# Patient Record
Sex: FEMALE | Race: WHITE
[De-identification: names, ages, dates, MRNs, and addresses within clinical notes are randomized per-mention and may not be internally consistent; named-entity substitution may affect disease eponyms.]

---

## 2020-02-07 ENCOUNTER — HOSPITAL ENCOUNTER (EMERGENCY)
Dept: HOSPITAL 62 - ER | Age: 25
Discharge: HOME | End: 2020-02-07
Payer: SELF-PAY

## 2020-02-07 VITALS — SYSTOLIC BLOOD PRESSURE: 143 MMHG | DIASTOLIC BLOOD PRESSURE: 72 MMHG

## 2020-02-07 DIAGNOSIS — Z91.013: ICD-10-CM

## 2020-02-07 DIAGNOSIS — R06.00: ICD-10-CM

## 2020-02-07 DIAGNOSIS — J20.9: Primary | ICD-10-CM

## 2020-02-07 PROCEDURE — 87070 CULTURE OTHR SPECIMN AEROBIC: CPT

## 2020-02-07 PROCEDURE — 99283 EMERGENCY DEPT VISIT LOW MDM: CPT

## 2020-02-07 PROCEDURE — 81025 URINE PREGNANCY TEST: CPT

## 2020-02-07 PROCEDURE — 94640 AIRWAY INHALATION TREATMENT: CPT

## 2020-02-07 PROCEDURE — 71046 X-RAY EXAM CHEST 2 VIEWS: CPT

## 2020-02-07 PROCEDURE — 87880 STREP A ASSAY W/OPTIC: CPT

## 2020-02-07 NOTE — RADIOLOGY REPORT (SQ)
EXAM DESCRIPTION: 



XR CHEST 2 VIEWS



COMPLETED DATE/TME:  02/07/2020 19:30



CLINICAL HISTORY: 



25 years, Female, cough, subj fever



Comparison: None 



FINDINGS:

No focal lung consolidation.

No pleural effusion. No pneumothorax.

Cardiac and mediastinal silhouette is unremarkable.

No acute osseous abnormality. 

Soft tissues are unremarkable. 



IMPRESSION:

No acute findings. No focal lung consolidation.

## 2020-02-07 NOTE — ER DOCUMENT REPORT
ED General





- General


Stated Complaint: COUGH,TROUBLE BREATHING


Time Seen by Provider: 02/07/20 19:30


Primary Care Provider: 


[facility] [Provider Group] - Follow up in 3-5 days


PARAMJIT BAXTER MD [COMMUNITY BASED STAFF] - Follow up in 3-5 days


Notes: 





25-year-old female presents with cough and sore throat since Monday.  Patient 

states she has been coughing up "phlegm."  Patient states she is felt 

intermittently "hot and cold."  Patient states she looked at the back of her 

throat in the mirror and states it was "cherry red."  Patient denies any 

nausea/vomiting.  Patient states coworker was recently diagnosed with pneumonia.

 Patient denies any history of asthma or COPD.


TRAVEL OUTSIDE OF THE [place] IN LAST 30 DAYS: No





- Related Data


Allergies/Adverse Reactions: 


                                        





shrimp Allergy (Uncoded 01/13/15 19:44)


   Hives











Past Medical History





- General


Information source: Patient





- Social History


Smoking Status: Unknown if Ever Smoked


Family History: Reviewed & Not Pertinent





Review of Systems





- Review of Systems


Notes: 





Constitutional: Negative for fever.


HENT: Negative for sore throat.


Eyes: Negative for visual changes.


Cardiovascular: Negative for chest pain.


Respiratory: Positive for shortness of breath and productive cough.


Gastrointestinal: Negative for abdominal pain, vomiting or diarrhea.


Genitourinary: Negative for dysuria.


Musculoskeletal: Negative for back pain.


Skin: Negative for rash.


Neurological: Negative for headaches, weakness or numbness.





10 point ROS negative except as marked above and in HPI.





Physical Exam





- Vital signs


Vitals: 


                                        











Temp Pulse Resp BP Pulse Ox


 


 98.2 F   113 H  16   128/75 H  96 


 


 02/07/20 19:24  02/07/20 19:24  02/07/20 19:24  02/07/20 19:24  02/07/20 19:24














- Notes


Notes: 





GENERAL: Well-appearing, well-nourished and in no acute distress.


HEAD: Atraumatic, normocephalic.


EYES: Extraocular movements intact, sclera anicteric, conjunctiva are normal.


ENT: Nares patent, mild tonsillar hypertrophy bilaterally,  No erythema.  No 

exudates.  No PTA.  No muffled voice.  No trismus.  Uvula midline without edema.

 Moist mucous membranes.


NECK: Normal range of motion, supple without lymphadenopathy or JVD.


LUNGS: Decreased breath sounds with mild wheezing.


HEART: Regular rate and rhythm without murmurs, rubs or gallops.


EXTREMITIES: Normal range of motion, no pitting or edema.  No clubbing or 

cyanosis.


NEUROLOGICAL: Cranial nerves II through XII grossly intact.  Normal speech, 

normal gait.


PSYCH: Normal mood, normal affect.


SKIN: Warm, Dry, normal turgor, no rashes or lesions noted.





Course





- Re-evaluation


Re-evalutation: 





02/07/20 nontoxic, well-appearing 25-year-old female presents with sore throat 

and cough.  Lungs decreased breath sounds with mild wheezing.  Breathing 

treatment with prednisone ordered.  Throat appears with mild tonsillar 

hypertrophy.  No signs of PTA.  PE is otherwise unremarkable.  Strep test was 

ordered.  Chest x-ray was also ordered.





02/07/20 20:31 Strep negative. CXR negative. Pt's symptoms most consistent with 

acute bronchitis. Pt given albuterol inhaler, tessalon perles, and prednisone. 

Strict return precautions given. Close follow up with PCP. Pt voices 

understanding and agrees with plan of care. 








- Vital Signs


Vital signs: 


                                        











Temp Pulse Resp BP Pulse Ox


 


 97.8 F   103 H  16   143/72 H  96 


 


 02/07/20 20:46  02/07/20 20:46  02/07/20 20:46  02/07/20 20:46  02/07/20 20:46














Discharge





- Discharge


Clinical Impression: 


Acute bronchitis


Qualifiers:


 Bronchitis organism: unspecified organism Qualified Code(s): J20.9 - Acute 

bronchitis, unspecified





Condition: Stable


Disposition: HOME, SELF-CARE


Instructions:  Bronchitis With Bronchospasm (Wheezing) ([facility])


Additional Instructions: 


Your chest x-ray did not show any pneumonia.  It was otherwise normal.  Your 

strep test was negative.  Your symptoms are most consistent with acute 

bronchitis.  Please take prednisone as prescribed and finish all doses.  Please 

use inhaler as needed for shortness of breath.  Please use Tessalon Perles as 

needed for cough.  Please follow-up with your primary care doctor 1 of the 

clinics listed in 3 to 4 days.  Return immediately to ER if you start having any

worsening symptoms, including fever, coughing up blood, chest pain, worsening 

shortness of breath, abdominal pain, nausea/vomiting, or any other symptoms that

are concerning to you.


Prescriptions: 


Benzonatate [Tessalon Perles 100 mg Capsule] 100 mg PO Q8HP PRN #40 capsule


 PRN Reason: 


Prednisone [Deltasone 20 mg Tablet] 2 tab PO BID 5 Days #10 tablet


Albuterol Sulfate [Proair HFA Inhalation Aerosol 8.5 gm MDI] 2 puff IH Q4H PRN 

#1 mdi


 PRN Reason: 


Forms:  Return to Work


Referrals: 


PARAMJIT BAXTER MD [COMMUNITY BASED STAFF] - Follow up in 3-5 days


[facility] [Provider Group] - Follow up in 3-5 days

## 2020-05-07 ENCOUNTER — HOSPITAL ENCOUNTER (EMERGENCY)
Dept: HOSPITAL 62 - ER | Age: 25
Discharge: HOME | End: 2020-05-07
Payer: SELF-PAY

## 2020-05-07 VITALS — SYSTOLIC BLOOD PRESSURE: 118 MMHG | DIASTOLIC BLOOD PRESSURE: 69 MMHG

## 2020-05-07 DIAGNOSIS — N39.0: Primary | ICD-10-CM

## 2020-05-07 LAB
APPEARANCE UR: (no result)
APTT PPP: YELLOW S
BILIRUB UR QL STRIP: NEGATIVE
GLUCOSE UR STRIP-MCNC: NEGATIVE MG/DL
KETONES UR STRIP-MCNC: NEGATIVE MG/DL
NITRITE UR QL STRIP: POSITIVE
PH UR STRIP: 7 [PH] (ref 5–9)
PROT UR STRIP-MCNC: NEGATIVE MG/DL
SP GR UR STRIP: 1.02
UROBILINOGEN UR-MCNC: NEGATIVE MG/DL (ref ?–2)

## 2020-05-07 PROCEDURE — 87086 URINE CULTURE/COLONY COUNT: CPT

## 2020-05-07 PROCEDURE — 87186 SC STD MICRODIL/AGAR DIL: CPT

## 2020-05-07 PROCEDURE — 81001 URINALYSIS AUTO W/SCOPE: CPT

## 2020-05-07 PROCEDURE — 87088 URINE BACTERIA CULTURE: CPT

## 2020-05-07 PROCEDURE — 99284 EMERGENCY DEPT VISIT MOD MDM: CPT

## 2020-05-07 PROCEDURE — 81025 URINE PREGNANCY TEST: CPT

## 2020-05-07 NOTE — ER DOCUMENT REPORT
HPI





- HPI


Time Seen by Provider: 05/07/20 23:08


Pain Level: 1


Context: 


Patient is a 25-year-old female that comes to the emergency department for chief

complaint of painful urination.  She states she has noticed developing symptoms 

over the past 3 days.  She reports intermittent flank pain, denies abdominal 

pain, denies fever/chills, nausea/vomiting.  Past medical history of a C-

section, LMP within the past month.  She denies history of kidney stones.





- REPRODUCTIVE


Reproductive: DENIES: Pregnant:





Past Medical History





- General


Information source: Patient





- Social History


Smoking Status: Never Smoker


Frequency of alcohol use: None


Drug Abuse: None


Lives with: Family


Family History: Reviewed & Not Pertinent


Patient has homicidal ideation: No





- Medical History


Medical History: Negative


Surgical Hx: Negative





- Immunizations


Immunizations up to date: Yes


Hx Diphtheria, Pertussis, Tetanus Vaccination: Yes





Vertical Provider Document





- CONSTITUTIONAL


General Appearance: WD/WN, No Apparent Distress





- INFECTION CONTROL


TRAVEL OUTSIDE OF THE [place] IN LAST 30 DAYS: No





- HEENT


HEENT: Atraumatic, Normocephalic





- NECK


Neck: Normal Inspection





- RESPIRATORY


Respiratory: Breath Sounds Normal, No Respiratory Distress





- CARDIOVASCULAR


Cardiovascular: Regular Rate, Regular Rhythm





- GI/ABDOMEN


Gastrointestinal: Abdomen Soft, Abdomen Non-Tender.  negative: Abdomen Tender





- BACK


Back: Normal Inspection.  negative: CVA Tenderness-Right, CVA Tenderness-Left





- MUSCULOSKELETAL/EXTREMETIES


Musculoskeletal/Extremeties: MAEW, FROM, Non-Tender





- NEURO


Level of Consciousness: Awake, Alert, Appropriate


Motor/Sensory: No Motor Deficit, No Sensory Deficit





- DERM


Integumentary: Warm, Dry, No Rash





Course





- Re-evaluation


Re-evalutation: 


Patient with soft benign abdomen, no CVA tenderness, unremarkable vital signs 

including no fever, no vomiting.  Patient symptoms of dysuria, urine indicates 

infection, pregnancy negative.  Culture placed.  Treating with antibiotics, 

discussed expectations, follow-up, return precautions.  Patient states 

understanding and agreement with plan.





- Vital Signs


Vital signs: 


                                        











Temp Pulse Resp BP Pulse Ox


 


 98.8 F   90   20   118/69   98 


 


 05/07/20 23:03  05/07/20 22:08  05/07/20 22:08  05/07/20 22:08  05/07/20 22:08














- Laboratory


Laboratory results interpreted by me: 


                                        











  05/07/20





  21:55


 


Urine Blood  SMALL H


 


Urine Nitrite  POSITIVE H


 


Ur Leukocyte Esterase  SMALL H














Discharge





- Discharge


Clinical Impression: 


 Dysuria





Urinary tract infection


Qualifiers:


 Urinary tract infection type: site unspecified Hematuria presence: without 

hematuria Qualified Code(s): N39.0 - Urinary tract infection, site not specified





Condition: Stable


Disposition: HOME, SELF-CARE


Additional Instructions: 


Your symptoms and work-up indicate a urinary tract infection.  Take antibiotics 

as prescribed to completion.  Drink plenty of fluids.  Follow-up with primary 

care.  Return if you worsen including developing severe abdominal or flank pain,

vomiting, fever, or any other concerning symptoms.


Prescriptions: 


Cephalexin Monohydrate [Keflex 500 mg Capsule] 500 mg PO BID 5 Days #10 capsule

## 2020-08-11 ENCOUNTER — HOSPITAL ENCOUNTER (EMERGENCY)
Dept: HOSPITAL 62 - ER | Age: 25
Discharge: HOME | End: 2020-08-11
Payer: SELF-PAY

## 2020-08-11 VITALS — SYSTOLIC BLOOD PRESSURE: 115 MMHG | DIASTOLIC BLOOD PRESSURE: 82 MMHG

## 2020-08-11 DIAGNOSIS — F17.200: ICD-10-CM

## 2020-08-11 DIAGNOSIS — J06.9: Primary | ICD-10-CM

## 2020-08-11 PROCEDURE — 81025 URINE PREGNANCY TEST: CPT

## 2020-08-11 PROCEDURE — 99284 EMERGENCY DEPT VISIT MOD MDM: CPT

## 2020-08-11 PROCEDURE — 87880 STREP A ASSAY W/OPTIC: CPT

## 2020-08-11 PROCEDURE — 87070 CULTURE OTHR SPECIMN AEROBIC: CPT

## 2020-08-11 PROCEDURE — 71045 X-RAY EXAM CHEST 1 VIEW: CPT

## 2020-08-11 NOTE — RADIOLOGY REPORT (SQ)
EXAM DESCRIPTION:  CHEST SINGLE VIEW



IMAGES COMPLETED DATE/TIME:  8/11/2020 3:33 pm



REASON FOR STUDY:  cough



COMPARISON:  2/7/2020.



EXAM PARAMETERS:  NUMBER OF VIEWS: One view.

TECHNIQUE: Single frontal radiographic view of the chest acquired.

RADIATION DOSE: NA

LIMITATIONS: None.



FINDINGS:  LUNGS AND PLEURA: No opacities, masses or pneumothorax. No pleural effusion.

MEDIASTINUM AND HILAR STRUCTURES: No masses.  Contour normal.

HEART AND VASCULAR STRUCTURES: Heart normal in size.  Normal vasculature.

BONES: No acute findings.

HARDWARE: None in the chest.

OTHER: No other significant finding.



IMPRESSION:  NO ACUTE RADIOGRAPHIC FINDING IN THE CHEST.



TECHNICAL DOCUMENTATION:  JOB ID:  [id number]

 2011 [employer]- All Rights Reserved



Reading location - IP/workstation name: [email or web address]

## 2020-08-11 NOTE — ER DOCUMENT REPORT
ED General





- General


Chief Complaint: Cold Symptoms


Stated Complaint: COUGH,SORE THROAT


Notes: 





25-year-old female presenting today with 2 days of sinus congestion, ear 

pressure and cough.  States that she has a yellowish-brown productive cough.  

She has not taken anything with help alleviate her symptoms.  Also reports a 

sore throat.  Reports postnasal drip and having to clear her throat.  Patient 

does vape.  Has had a dry mouth.  No nausea or vomiting.  No abdominal pain no 

shortness of breath no additional symptoms at this time.


TRAVEL OUTSIDE OF THE [place] IN LAST 30 DAYS: No





- Related Data


Allergies/Adverse Reactions: 


                                        





Fish Containing Products Allergy (Verified 20 14:49)


   


Iodine and Iodide Containing Produc Allergy (Verified 20 14:49)


   


shellfish derived Allergy (Verified 20 14:49)


   


shrimp Allergy (Uncoded 01/13/15 19:44)


   Hives











Past Medical History





- Social History


Smoking Status: Current Every Day Smoker


Frequency of alcohol use: None


Drug Abuse: None


Family History: Reviewed & Not Pertinent


Patient has homicidal ideation: No


Past Surgical History: Reports: Hx  Section - x2, Hx Orthopedic Surgery 

- right thumb





- Immunizations


Immunizations up to date: Yes


Hx Diphtheria, Pertussis, Tetanus Vaccination: Yes





Physical Exam





- Vital signs


Vitals: 


                                        











Temp Pulse Resp BP Pulse Ox


 


 98.9 F   99   18   121/78   98 


 


 20 14:30  20 14:30  20 14:30  20 14:30  20 14:30














Course





- Re-evaluation


Re-evalutation: 





20 16:10


Patient's rapid strep is negative.  Patient's chest x-ray is unremarkable. Her 

vitals have been unremarkable.  I suspect patient has an upper respiratory 

infection.  I have prescirbed tessalon pearls for her cough. I recommend sudafed

for her congestion- this is available otc. I discussed return precautions to 

include worsening symptoms or the development of new symptoms. Patient 

acknowledges and verbalizes understanding of instructions and plan. All 

questions answered. 











- Vital Signs


Vital signs: 


                                        











Temp Pulse Resp BP Pulse Ox


 


 98.8 F   80   16   115/82   98 


 


 20 17:38  20 17:38  20 17:38  20 17:38  20 17:38














Discharge





- Discharge


Clinical Impression: 


Upper respiratory infection


Qualifiers:


 URI type: unspecified URI Qualified Code(s): J06.9 - Acute upper respiratory 

infection, unspecified





Condition: Stable


Disposition: HOME, SELF-CARE


Instructions:  Upper Respiratory Illness ([facility])


Additional Instructions: 


Your strep test was negative and your chest x-ray was unremarkable.  You may use

over-the-counter decongestants for your sinus congestion at this time.  I have 

prescribed you a medication for your cough. Please take as prescribed. Please 

return to the emergency department for worsening symptoms or development of new 

symptoms.


Prescriptions: 


Benzonatate [Tessalon Perles 100 mg Capsule] 100 mg PO Q8HP PRN #40 capsule


 PRN Reason:

## 2020-11-14 ENCOUNTER — HOSPITAL ENCOUNTER (EMERGENCY)
Dept: HOSPITAL 62 - ER | Age: 25
LOS: 1 days | Discharge: HOME | End: 2020-11-15
Payer: SELF-PAY

## 2020-11-14 DIAGNOSIS — S70.02XA: ICD-10-CM
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
## 2020-11-14 DIAGNOSIS — T78.1XXA: Primary | ICD-10-CM
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
## 2020-11-14 DIAGNOSIS — Z91.013: ICD-10-CM

## 2020-11-14 DIAGNOSIS — R22.0: ICD-10-CM

## 2020-11-14 DIAGNOSIS — R09.89: ICD-10-CM

## 2020-11-14 DIAGNOSIS — L29.9: ICD-10-CM
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
## 2020-11-14 PROCEDURE — 96372 THER/PROPH/DIAG INJ SC/IM: CPT

## 2020-11-14 PROCEDURE — 73502 X-RAY EXAM HIP UNI 2-3 VIEWS: CPT

## 2020-11-14 PROCEDURE — 96374 THER/PROPH/DIAG INJ IV PUSH: CPT

## 2020-11-14 PROCEDURE — 99284 EMERGENCY DEPT VISIT MOD MDM: CPT

## 2020-11-14 PROCEDURE — 96375 TX/PRO/DX INJ NEW DRUG ADDON: CPT

## 2020-11-14 NOTE — ER DOCUMENT REPORT
ED Medical Screen (RME)





- General


Chief Complaint: Allergic Reaction


Stated Complaint: ALLERGIC REACTION


Time Seen by Provider: 20 21:45


Mode of Arrival: Ambulatory


Information source: Patient


Notes: 





25-year-old female presented to ED for fall at work.  She is an assistant 

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  She states

when she fell she got some of the autosuture juice or something in on her face 

and in her nose and throat.  She states her face has been swollen and her throat

is scratchy and itchy and sore since then.  She states she is allergic to 

seafood.  He does have a swollen face at this time.  She states somebody at the 

work gave her some steroid cream that she put on her face and it did not help.  

Medical history is she does vape she drinks daily had her right thumb reattached

as a child C-sections and bronchitis.  Lungs are clear at this time.  

Depressions are regular.  She states she also has tenderness to the left hip.  

There is no bruising scratches or abrasions to the right hip.  Will give Solu-

Medrol, Pepcid, and Toradol in the triage area and she will get epinephrine when

she gets into her room.




















I have greeted and performed a rapid initial assessment of this patient.  A 

comprehensive ED assessment and evaluation of the patient, analysis of test 

results and completion of medical decision making process will be conducted by 

an additional ED providers.


TRAVEL OUTSIDE OF THE [place] IN LAST 30 DAYS: No





- Related Data


Allergies/Adverse Reactions: 


                                        





Fish Containing Products Allergy (Verified 20 14:49)


   


Iodine and Iodide Containing Produc Allergy (Verified 20 14:49)


   


shellfish derived Allergy (Verified 20 14:49)


   


shrimp Allergy (Uncoded 01/13/15 19:44)


   Hives











Past Medical History





- Social History


Frequency of alcohol use: Social


Drug Abuse: None


Past Surgical History: Reports: Hx  Section - x2, Hx Orthopedic Surgery 

- right thumb





- Immunizations


Immunizations up to date: Yes


Hx Diphtheria, Pertussis, Tetanus Vaccination: Yes





Physical Exam





- Vital signs


Vitals: 





                                        











Temp


 


 98.2 F 


 


 20 21:46














Course





- Vital Signs


Vital signs: 





                                        











Temp Pulse Resp BP Pulse Ox


 


 98.2 F   101 H  20   130/86 H  95 


 


 20 21:47  20 21:47  20 21:47  20 21:47  20 21:47

## 2020-11-14 NOTE — RADIOLOGY REPORT (SQ)
EXAM DESCRIPTION: 



XR HIP 2 OR MORE VIEWS



COMPLETED DATE/TME:  11/14/2020 22:49



CLINICAL HISTORY: 



25 years, Female, hip pain after slipping and falling at work



COMPARISON:

None.



NUMBER OF VIEWS:

2



TECHNIQUE:

2 views of the left hip and pelvis were obtained.



LIMITATIONS:

None.



FINDINGS:



There is no bone or joint abnormality.  There is incidental IUD

artifact within the pelvis.



IMPRESSION:



No acute abnormality as above.

 



copyright 2011 [employer]- All Rights Reserved

## 2020-11-14 NOTE — ER DOCUMENT REPORT
ED General





- General


Chief Complaint: Allergic Reaction


Stated Complaint: ALLERGIC REACTION


Time Seen by Provider: 20 21:45


Primary Care Provider: 


ALTAGRACIA CRUZ MD [HONORARY] - Follow up as needed


Mode of Arrival: Ambulatory


TRAVEL OUTSIDE OF THE [place] IN LAST 30 DAYS: No





- HPI


Context: 





This is a 25-year-old female who presents to the emergency department 

complaining of facial swelling and scratchy throat after being exposed to oyster

 juice at work.  Patient works as an  at [employer] and was 

shucking oysters earlier this evening.  Patient accidentally slipped and fell, 

inadvertently getting some oyster juice on her face and in her throat.  Patient 

stated that soon afterward she noticed facial swelling and pruritus.  A

dditionally, patient states her throat is scratchy and feels swollen.  Patient 

states she is allergic to all types of seafood.  Patient denies shortness of 

breath.  Patient is complaining of 3 out of 5 left hip pain; patient states she 

landed on her left hip when she fell.  Patient states nothing exacerbates the 

pain in her hip and nothing alleviates the pain.


Associated symptoms: Other - See HPI


Exacerbated by: Other - See HPI


Relieved by: Other - See HPI





- Related Data


Allergies/Adverse Reactions: 


                                        





Fish Containing Products Allergy (Verified 20 14:49)


   


Iodine and Iodide Containing Produc Allergy (Verified 20 14:49)


   


shellfish derived Allergy (Verified 20 14:49)


   


shrimp Allergy (Uncoded 01/13/15 19:44)


   Hives











Past Medical History





- General


Information source: Patient





- Social History


Smoking Status: Never Smoker


Frequency of alcohol use: Social


Drug Abuse: None


Family History: Reviewed & Not Pertinent


Patient has suicidal ideation: No


Patient has homicidal ideation: No


Past Surgical History: Reports: Hx  Section - x2, Hx Orthopedic Surgery 

- right thumb





- Immunizations


Immunizations up to date: Yes


Hx Diphtheria, Pertussis, Tetanus Vaccination: Yes





Review of Systems





- Review of Systems


Constitutional: No symptoms reported


EENT: Throat swelling, Other - Facial swelling


Cardiovascular: No symptoms reported


Respiratory: No symptoms reported


Gastrointestinal: No symptoms reported


Genitourinary: No symptoms reported


Female Genitourinary: No symptoms reported


Musculoskeletal: No symptoms reported


Skin: See HPI


Hematologic/Lymphatic: No symptoms reported


Neurological/Psychological: No symptoms reported


-: Yes All other systems reviewed and negative





Physical Exam





- Vital signs


Vitals: 


                                        











Temp


 


 98.2 F 


 


 20 21:46














- Notes


Notes: 








CONSTITUTIONAL 


[Vital signs reviewed, Patient appears comfortable, Alert and oriented X 3


HEAD 


[Atraumatic, Normocephalic.  Patient has some moderate facial swelling]


EYES 


[Eyes are normal to inspection, No discharge from eyes, Extraocular muscles 

intact, Sclera are normal, Conjunctiva are injected bilaterally.]


ENT 


[External ears normal to inspection, Nose examination normal, posterior 

oropharynx is erythematous and edematous.  There is no stridor]


NECK 


[Normal ROM, No jugular venous distention, No meningeal signs, ]


RESPIRATORY CHEST 


[Chest is nontender, Breath sounds normal, No respiratory distress.]


CARDIOVASCULAR 


[RRR, No murmurs, Normal S1 S2, No rub, No gallop.]


ABDOMEN 


[Abdomen is nontender, No pulsatile masses, No other masses, Bowel sounds 

normal, No distension, No peritoneal signs, No hernias.]


BACK 


[There is no CVA Tenderness, There is no tenderness to palpation, Normal 

inspection.]


UPPER EXTREMITY 


[Inspection normal, No cyanosis, No clubbing, No edema, 


LOWER EXTREMITY 


[Inspection normal, No cyanosis, No clubbing, No edema, No calf tenderness, 

there is no foreshortening of either lower extremity and patient has full active

 range of motion in her left hip


NEURO 


[No focal motor deficits, No focal sensory deficits, Speech normal.]


SKIN 


[Skin is warm, Skin is dry, Skin is normal color.]


PSYCHIATRIC 


[Normal affect. ]





Course





- Re-evaluation


Re-evalutation: 





11/15/20 00:39


Patient is resting comfortably.  Patient states her hip pain is improved.  

Patient has a O2 sat of 99% on room air heart rate of 87 blood pressure 115/65. 

Results of ED MSE discussed with patient prescriptions for 2 days of prednisone 

and EpiPen discussed with patient to.  Patient instructed to always keep an 

EpiPen on her person at all times.  All questions were answered prior to 

discharge.  Emergency signs and symptoms, reasons to return to the emergency 

department discussed with patient.





- Vital Signs


Vital signs: 


                                        











Temp Pulse Resp BP Pulse Ox


 


 98.2 F   101 H  22 H  115/65   96 


 


 20 21:47  20 21:47  11/15/20 00:01  11/15/20 00:00  11/15/20 00:01














- Diagnostic Test


Radiology reviewed: Reports reviewed





Critical Care Note





- Critical Care Note


Total time excluding time spent on procedures (mins): 60 - Acute allergic 

reaction with throat swelling requiring epinephrine





Discharge





- Discharge


Clinical Impression: 


Acute allergic reaction


Qualifiers:


 Encounter type: initial encounter Qualified Code(s): T78.40XA - Allergy, 

unspecified, initial encounter





Contusion of left hip


Qualifiers:


 Encounter type: initial encounter Qualified Code(s): S70.02XA - Contusion of 

left hip, initial encounter





Condition: Stable


Disposition: HOME, SELF-CARE


Additional Instructions: 


Return to the Emergency Department without delay if any worse.





Always carry an EpiPen on your person at all times.





HOME CARE INSTRUCTIONS & INFORMATION:  Thank you for choosing us for your 

medical needs. We hope you're satisfied with the care you received.  After you 

leave, you must properly care for your problem and, at the same time, observe 

its progress.  Any condition can change.  Some illnesses can change rapidly over

hours or days.  If your condition worsens, return to the Emergency Department or

see your physician promptly.





ABOUT YOUR X-RAYS AND EKG'S:   If you had an EKG or X-rays taken, they have been

read by the Emergency Physician. The X-rays and EKG's will also be read by a 

Radiologist or Cardiologist within 24 hours.  If discrepancies are noted, you 

will be notified by telephone.  Please be certain the ED has a correct telephone

number & address where you can be reached.  Also, realize that some fractures or

abnormalities do not show up on initial X-rays.  If your symptoms continue, see 

your physician.





ABOUT YOUR LABORATORY TEST:   If you had laboratory tests, the results have been

reviewed by the Emergency Physician.  Some test results (for example cultures) 

may not be available for several days.  You will be contacted if any test result

shows you need additional treatment.  Please be certain the ED has a correct 

telephone number and address where you can be reached.





ABOUT YOUR MEDICATIONS:  You will receive instructions on how to take your 

medicine on the prescription label you receive.  Additional information may be 

provided by the Pharmacy.  If you have questions afterwards, call the ED for 

clarification or further instructions.  Some prescribed medications may cause 

drowsiness.  Do not perform tasks such as driving a car or operating machinery 

without consulting your Pharmacist.  If you feel you need a refill of pain 

medication, your condition will need re-evaluation.  Please do not call for a 

refill of any medication.





ABOUT YOUR SIGNATURE:   Signature of this document acknowledges to followin. Understanding that you received emergency treatment and that you may be 

   released before al medical problems are known or treated. Please be certain  

   the ED has a correct phone number & address where you can be reached.


   2. Acknowledgement that you will arrange for follow-up care as recommended.


   3. Authorization for the Emergency Physician to provide information to your 

follow-up Physician in order to maximize your care.





AT ANY TIME, IF YOUR SYMPTOMS CHANGE SIGNIFICANTLY OR WORSEN OR YOU DEVELOP NEW 

SYMPTOMS, RETURN TO THE EMERGENCY DEPARTMENT IMMEDIATELY FOR RE-EVALUATION.





OUR GOAL IS TO PROVIDE EXCELLENT MEDICAL CARE!





WE HOPE THAT WE HAVE MET YOUR EXPECTATIONS DURING YOUR EMERGENCY DEPARTMENT 

VISIT AND THAT YOU FEEL YOU HAVE RECEIVED EXCELLENT CARE!





Acute Allergic Reaction





   Your symptoms are due to an allergic reaction.  Allergy can cause hives, 

swelling of the hands, feet, and face, hoarseness, and difficulty swallowing or 

breathing.  It may be due to exposure to medication, animal dander, foods, 

infection, or insect bites.  Medication is a common cause, even when prior use 

of this same medication caused no problems. 


   Acute treatment may include adrenalin and antihistamines.  Usually, the 

specific allergic agent can't be identified unless repeated episodes occur.


   Home treatment includes the following:


   (1) Stop any suspicious medications.  This will be discussed with you.


   (2) Oral antihistamines for the next four to five days.  Example, 

diphenhydramine (Benadryl) every four hours.


   (3) You may also use cimetidine (Tagamet), or famotidine (Pepcid) every four 

hours if diphenhydramine is not controlling itching and hives.


   (4) Avoid aspirin until the hives completely disappear.


   (5) Avoid hot baths or showers until the hives are completely gone. 


   Call the doctor if faintness, difficulty swallowing, tightness in the chest, 

or wheezing occurs.








Epinephrine





     An injection of epinephrine (also called adrenalin) is used to treat 

allergic reactions, asthma, and some other medical conditions.  It is a 

stimulant medication that consticts blood vessels, relaxes smooth muscles such 

as in the bronchioles of the lung, elevates blood pressure, and increases heart 

rate.  It can temporarily make you feel very nervous and shakey, but it's 

affects last only a short time, about 15 to 30 minutes at most.


Prescriptions: 


Prednisone [Deltasone 20 mg Tablet] 3 tab PO DAILY 2 Days #6 tablet


Epinephrine [Epipen 2-Sukumar] 0.3 mg IM ONCE PRN #1 packet


 PRN Reason: 


Referrals: 


ALTAGRACIA CRUZ MD [HONORARY] - Follow up as needed

## 2020-11-15 VITALS — SYSTOLIC BLOOD PRESSURE: 110 MMHG | DIASTOLIC BLOOD PRESSURE: 71 MMHG

## 2020-12-22 ENCOUNTER — HOSPITAL ENCOUNTER (EMERGENCY)
Dept: HOSPITAL 62 - ER | Age: 25
Discharge: HOME | End: 2020-12-22
Payer: SELF-PAY

## 2020-12-22 VITALS — DIASTOLIC BLOOD PRESSURE: 69 MMHG | SYSTOLIC BLOOD PRESSURE: 110 MMHG

## 2020-12-22 DIAGNOSIS — N73.0: ICD-10-CM

## 2020-12-22 DIAGNOSIS — U07.1: Primary | ICD-10-CM

## 2020-12-22 DIAGNOSIS — N83.201: ICD-10-CM

## 2020-12-22 DIAGNOSIS — R11.0: ICD-10-CM

## 2020-12-22 DIAGNOSIS — R05: ICD-10-CM

## 2020-12-22 DIAGNOSIS — N76.0: ICD-10-CM

## 2020-12-22 DIAGNOSIS — R10.2: ICD-10-CM

## 2020-12-22 LAB
ABSOLUTE LYMPHOCYTES# (MANUAL): 5.1 10^3/UL (ref 0.5–4.7)
ABSOLUTE MONOCYTES # (MANUAL): 0.8 10^3/UL (ref 0.1–1.4)
ADD MANUAL DIFF: YES
ALBUMIN SERPL-MCNC: 4.1 G/DL (ref 3.5–5)
ALP SERPL-CCNC: 79 U/L (ref 38–126)
ANION GAP SERPL CALC-SCNC: 7 MMOL/L (ref 5–19)
APPEARANCE UR: CLEAR
APTT PPP: YELLOW S
AST SERPL-CCNC: 44 U/L (ref 14–36)
BASOPHILS NFR BLD MANUAL: 0 % (ref 0–2)
BILIRUB DIRECT SERPL-MCNC: 0.2 MG/DL (ref 0–0.4)
BILIRUB SERPL-MCNC: 0.4 MG/DL (ref 0.2–1.3)
BILIRUB UR QL STRIP: NEGATIVE
BUN SERPL-MCNC: 8 MG/DL (ref 7–20)
CALCIUM: 8.9 MG/DL (ref 8.4–10.2)
CHLAM PCR: NOT DETECTED
CHLORIDE SERPL-SCNC: 102 MMOL/L (ref 98–107)
CO2 SERPL-SCNC: 29 MMOL/L (ref 22–30)
EOSINOPHIL NFR BLD MANUAL: 1 % (ref 0–6)
ERYTHROCYTE [DISTWIDTH] IN BLOOD BY AUTOMATED COUNT: 12.6 % (ref 11.5–14)
GLUCOSE SERPL-MCNC: 96 MG/DL (ref 75–110)
GLUCOSE UR STRIP-MCNC: NEGATIVE MG/DL
HCT VFR BLD CALC: 40.1 % (ref 36–47)
HGB BLD-MCNC: 13.3 G/DL (ref 12–15.5)
KETONES UR STRIP-MCNC: NEGATIVE MG/DL
MCH RBC QN AUTO: 29.1 PG (ref 27–33.4)
MCHC RBC AUTO-ENTMCNC: 33.2 G/DL (ref 32–36)
MCV RBC AUTO: 88 FL (ref 80–97)
MONOCYTES % (MANUAL): 7 % (ref 3–13)
NITRITE UR QL STRIP: NEGATIVE
PH UR STRIP: 7 [PH] (ref 5–9)
PLATELET # BLD: 200 10^3/UL (ref 150–450)
PLATELET CLUMP BLD QL SMEAR: PRESENT
PLATELET COMMENT: ADEQUATE
POTASSIUM SERPL-SCNC: 4.6 MMOL/L (ref 3.6–5)
PROT SERPL-MCNC: 8.1 G/DL (ref 6.3–8.2)
PROT UR STRIP-MCNC: NEGATIVE MG/DL
RBC # BLD AUTO: 4.58 10^6/UL (ref 3.72–5.28)
RBCS (WET MOUNT): (no result)
SEGMENTED NEUTROPHILS % (MAN): 46 % (ref 42–78)
SP GR UR STRIP: 1.02
T.VAGINALIS (WET MOUNT): (no result)
TOTAL CELLS COUNTED BLD: 100
UROBILINOGEN UR-MCNC: 2 MG/DL (ref ?–2)
VARIANT LYMPHS NFR BLD MANUAL: 30 % (ref 13–45)
WBC # BLD AUTO: 11.1 10^3/UL (ref 4–10.5)
WBCS (WET MOUNT): (no result)
YEAST (WET MOUNT): (no result)

## 2020-12-22 PROCEDURE — 87591 N.GONORRHOEAE DNA AMP PROB: CPT

## 2020-12-22 PROCEDURE — 83690 ASSAY OF LIPASE: CPT

## 2020-12-22 PROCEDURE — 85025 COMPLETE CBC W/AUTO DIFF WBC: CPT

## 2020-12-22 PROCEDURE — 81001 URINALYSIS AUTO W/SCOPE: CPT

## 2020-12-22 PROCEDURE — 99285 EMERGENCY DEPT VISIT HI MDM: CPT

## 2020-12-22 PROCEDURE — 87210 SMEAR WET MOUNT SALINE/INK: CPT

## 2020-12-22 PROCEDURE — 80053 COMPREHEN METABOLIC PANEL: CPT

## 2020-12-22 PROCEDURE — 84703 CHORIONIC GONADOTROPIN ASSAY: CPT

## 2020-12-22 PROCEDURE — 76856 US EXAM PELVIC COMPLETE: CPT

## 2020-12-22 PROCEDURE — 36415 COLL VENOUS BLD VENIPUNCTURE: CPT

## 2020-12-22 PROCEDURE — 71045 X-RAY EXAM CHEST 1 VIEW: CPT

## 2020-12-22 PROCEDURE — 96372 THER/PROPH/DIAG INJ SC/IM: CPT

## 2020-12-22 PROCEDURE — 87491 CHLMYD TRACH DNA AMP PROBE: CPT

## 2020-12-22 NOTE — ER DOCUMENT REPORT
ED General





- General


Chief Complaint: Vaginal Discharge


Stated Complaint: PELVIC PAIN,COUGH,NAUSEA


Time Seen by Provider: 20 15:42


Primary Care Provider: 


JANIS METCALF MD [ACTIVE STAFF] - Follow up in 3-5 days (for GYN follow up)


TRAVEL OUTSIDE OF THE [place] IN LAST 30 DAYS: No





- HPI


Notes: 





25-year-old female to the emergency department with complaints of brownish-green

vaginal discharge, lower pelvic pain, vaginal discomfort, cough, nausea for the 

past 2 weeks.  She states she started to notice the discharge after she had 

intercourse with a new partner.  She states initially that she thought it was 

just related to her.  But then she began to have pain and discomfort in her 

pelvis.  She states it does hurt a little bit when she urinates.  She denies any

flank pain.  She states that she is also had a cough for 2 weeks.  Admits that 

she has had a COVID-19 exposure but that was about a month ago.  She did get 

tested at that time and it was negative.  She is not sure that she has had any 

further exposure.  She denies any shortness of breath.  She does admit to 

nausea.  She denies chance for pregnancy.  The patient was evaluated during the 

global COVID 19  pandemic, and that diagnosis was suspected/considered upon 

their initial presentation.  Their evaluation, treatment, and testing was 

consistent with current guidelines for patients who present with complaints or 

symptoms that may be related to COVID-19.





- Related Data


Allergies/Adverse Reactions: 


                                        





Fish Containing Products Allergy (Verified 20 15:47)


   


Iodine and Iodide Containing Produc Allergy (Verified 20 15:47)


   


shellfish derived Allergy (Verified 20 15:47)


   


shrimp Allergy (Uncoded 20 15:47)


   Hives











Past Medical History





- General


Information source: Patient





- Social History


Smoking Status: Never Smoker


Chew tobacco use (# tins/day): No


Drug Abuse: None


Family History: Reviewed & Not Pertinent


Patient has homicidal ideation: No


Past Surgical History: Reports: Hx  Section - x2, Hx Orthopedic Surgery 

- right thumb





- Immunizations


Immunizations up to date: Yes


Hx Diphtheria, Pertussis, Tetanus Vaccination: Yes





Review of Systems





- Review of Systems


Constitutional: Chills, Fever


EENT: No symptoms reported


Cardiovascular: denies: Chest pain, Palpitations, Dizziness, Lightheaded


Respiratory: Cough.  denies: Short of breath, Wheezing


Gastrointestinal: Abdominal pain, Nausea.  denies: Diarrhea, Vomiting


Genitourinary: Dysuria, Frequency


Female Genitourinary: See HPI, Vaginal discharge


Musculoskeletal: No symptoms reported


Skin: No symptoms reported


Hematologic/Lymphatic: No symptoms reported


Neurological/Psychological: No symptoms reported


-: Yes All other systems reviewed and negative





Physical Exam





- Vital signs


Vitals: 


                                        











Temp Pulse Resp BP Pulse Ox


 


 100.8 F H  123 H  20   130/75 H  98 


 


 20 15:39  20 15:39  20 15:39  20 15:39  20 15:39











Interpretation: Tachycardic, Febrile





- Notes


Notes: 





PHYSICAL EXAMINATION:





GENERAL: Well-appearing, well-nourished and in no acute distress.  Noted vital 

signs.  Patient initially febrile upon check-in.





HEAD: Atraumatic, normocephalic.





EYES: Pupils equal round and reactive to light, extraocular movements intact, 

sclera anicteric, conjunctiva are normal.





ENT: nares patent, oropharynx clear without exudates.  Moist mucous membranes.





NECK: Normal range of motion, supple without lymphadenopathy





LUNGS: Breath sounds clear to auscultation bilaterally and equal.  No wheezes 

rales or rhonchi.





HEART: Regular rate and rhythm without murmurs





ABDOMEN: Soft, obese, normoactive bowel sounds.  There is tenderness to 

palpation over the pelvic abdomen in particular over the suprapubic abdomen.  No

guarding, no rebound.  No masses appreciated.  No CVA tenderness





GYN: There is malodorous foul copious amounts of yellowish-green discharge from 

the cervix.  Patient has positive CMT and tenderness in bilateral adnexa.  No 

adnexal fullness is appreciated.  There is no vaginal bleeding.  There are no 

external lesions.





EXTREMITIES: Normal range of motion, no pitting or edema.  No cyanosis.





NEUROLOGICAL: No focal neurological deficits. Moves all extremities 

spontaneously and on command.





PSYCH: Normal mood, normal affect.





SKIN: Warm, Dry, normal turgor, no rashes or lesions noted.





Course





- Re-evaluation


Re-evalutation: 





20 


Impression: Vaginal discharge very concerning for STD, likely PID, possible 

COVID-19 exposure, cough.  Patient with pelvic exam very concerning for gonoc

occal or chlamydial infection.  Likely with the pelvic pain she does have pelvic

inflammatory disease.  She states that she has a Mirena but I did not see the 

strings on her speculum exam.  She does have cervical motion tenderness and 

bilateral exit adnexal tenderness.  On her ultrasound a tubo-ovarian abscess was

not visualized but she does have a right sided ovarian lesion.  I have 

encouraged her to follow-up with GYN very closely for the PID and also to have a

repeat ultrasound in 6 months for this lesion.  She has been encouraged to 

return immediately if her symptoms worsen.  I have sent home with 2 weeks of 

doxycycline as well as Diflucan to prevent yeast infection.  Also will send home

with antinausea medicine and NSAIDs for pain control.





- Vital Signs


Vital signs: 


                                        











Temp Pulse Resp BP Pulse Ox


 


 98.5 F   91   18   110/69   99 


 


 20 20:12  20 20:12  20 20:12  20 20:12  20 20:12











20 noted improved vital signs








- Laboratory Results


Result Diagrams: 


                                 20 16:30





                                 20 16:30


Laboratory Results Interpreted: 


                                        











  20





  16:30 16:30 16:30


 


WBC  11.1 H  


 


Abs Lymphs (Manual)  5.1 H  


 


AST   44 H 


 


ALT   53 H 


 


Urine Urobilinogen    2.0 H


 


Ur Leukocyte Esterase    TRACE H











Critical Laboratory Results Reviewed: No Critical Results





- Radiology Results


Radiology Results Interpreted: 





20 





                                        





Pelvis Ultrasound  20 15:47


IMPRESSION:  1.  IUD as above.


2.  A complex right cystic mass as above.  See below.


 








Chest X-Ray  20 18:44


IMPRESSION:  NO ACUTE RADIOGRAPHIC FINDING IN THE CHEST.


 














Critical Radiology Results Reviewed: No Critical Results





Discharge





- Discharge


Clinical Impression: 


 Pelvic inflammatory disease (PID), Vaginal discharge, Cough, Encounter for 

laboratory testing for COVID-19 virus





Vaginitis


Qualifiers:


 Chronicity: acute Qualified Code(s): N76.0 - Acute vaginitis





Ovarian cystic mass


Qualifiers:


 Laterality: right Qualified Code(s): N83.201 - Unspecified ovarian cyst, right 

side





Condition: Stable


Disposition: HOME, SELF-CARE


Instructions:  Pelvic Inflammatory Disease ([facility]), Pelvic Pain ([facility])


Additional Instructions: 


Please complete all antibiotics without fail.  Take the antiyeast medicine at 

the end of the antibiotics to prevent yeast infection.  No sex until you are 

cleared by GYN.  Please contact partners and advised him to get tested.  Your 

vaginal discharge today was very concerning for possible STD.  Return 

immediately if you have worsening pain, persistent fever, nausea and vomiting.


Prescriptions: 


Benzonatate [Tessalon Perles 100 mg Capsule] 100 mg PO Q8HP PRN #40 capsule


 PRN Reason: 


Fluconazole [Diflucan] 150 mg PO ONCE PRN #1 tablet


 PRN Reason: 


Doxycycline Hyclate [Vibramycin 100 mg Tablet] 100 mg PO BID 14 Days #28 tablet


Diclofenac Sodium [Voltaren 25 Mg Tablet] 25 mg PO BID #20 tablet.


Ondansetron [Zofran Odt 4 mg Tablet] 1 - 2 tab PO Q4H PRN #15 tab.rapdis


 PRN Reason: For Nausea/Vomiting


Forms:  Return to Work


Referrals: 


JANIS METCALF MD [ACTIVE STAFF] - Follow up in 3-5 days (for GYN follow up)

## 2020-12-22 NOTE — RADIOLOGY REPORT (SQ)
EXAM DESCRIPTION:  CHEST SINGLE VIEW



IMAGES COMPLETED DATE/TIME:  12/22/2020 7:14 pm



REASON FOR STUDY:  cough for two weeks, fever



COMPARISON:  8/11/2020



EXAM PARAMETERS:  NUMBER OF VIEWS: One view.

TECHNIQUE: Single frontal radiographic view of the chest acquired.

RADIATION DOSE: NA

LIMITATIONS: None.



FINDINGS:  LUNGS AND PLEURA: No opacities, masses or pneumothorax. No pleural effusion.

MEDIASTINUM AND HILAR STRUCTURES: No masses.  Contour normal.

HEART AND VASCULAR STRUCTURES: Heart normal in size.  Normal vasculature.

BONES: No acute findings.

HARDWARE: None in the chest.

OTHER: No other significant finding.



IMPRESSION:  NO ACUTE RADIOGRAPHIC FINDING IN THE CHEST.



TECHNICAL DOCUMENTATION:  JOB ID:  [id number]

 2011 [employer]- All Rights Reserved



Reading location - IP/workstation name: [email or web address]

## 2020-12-22 NOTE — ER DOCUMENT REPORT
ED Medical Screen (RME)





- General


Chief Complaint: Pelvic Pain


Stated Complaint: PELVIC PAIN,COUGH,NAUSEA


Time Seen by Provider: 20 15:42


TRAVEL OUTSIDE OF THE [place] IN LAST 30 DAYS: No





- HPI


Notes: 





20 15:50


25-year-old female G17,P2 presents to the emergency room today for evaluation of

vaginal pain, she reports pain at her cervix and gray discoloration from her 

vagina.  Reports her last menstrual cycle was from -, which was 

concerning to her because she typically does not have prolonged menstrual 

cycles. patient reports nausea and denies any vomiting, fever or chills.  

Patient reports she does have the Mirena which was placed 2 years ago.  Reports 

she does have some referred pain to her left upper quadrant.  Denies any chest 

pain shortness of breath.





In triage patient's temperature is elevated with an elevated heart rate.





I have greeted and performed a rapid initial assessment of this patient.  A 

comprehensive ED assessment and evaluation of the patient, analysis of test 

results and completion of the medical decision making process will be conducted 

by additional ED providers.





PHYSICAL EXAMINATION:





GENERAL: Well-appearing, well-nourished and in no acute distress.





CV: s1, s2 regular 





LUNGS: No respiratory distress





abd: suprapubic tenderness on palpation.  No CVA tenderness appreciated 

bilaterally





Musculoskeletal: Normal range of motion





NEUROLOGICAL:  Normal speech, normal gait. 





SKIN: Warm, Dry, normal turgor, no rashes or lesions noted.








The patient was evaluated during a global COVID-19 pandemic and that diagnosis 

was suspected/considered upon their initial presentation.  Their evaluation, 

treatment and testing was consistent with current guidelines for patients who 

present with complaints or symptoms and may be related to COVID-19.





20 15:55








- Related Data


Allergies/Adverse Reactions: 


                                        





Fish Containing Products Allergy (Verified 20 15:47)


   


Iodine and Iodide Containing Produc Allergy (Verified 20 15:47)


   


shellfish derived Allergy (Verified 20 15:47)


   


shrimp Allergy (Uncoded 20 15:47)


   Hives











Past Medical History


Past Surgical History: Reports: Hx  Section - x2, Hx Orthopedic Surgery 

- right thumb





- Immunizations


Immunizations up to date: Yes


Hx Diphtheria, Pertussis, Tetanus Vaccination: Yes





Physical Exam





- Vital signs


Vitals: 


                                        











Temp Pulse Resp BP Pulse Ox


 


 100.8 F H  123 H  20   130/75 H  98 


 


 20 15:39  20 15:39  20 15:39  20 15:39  20 15:39














Course





- Vital Signs


Vital signs: 


                                        











Temp Pulse Resp BP Pulse Ox


 


 100.8 F H  123 H  20   130/75 H  98 


 


 20 15:39  20 15:39  20 15:39  20 15:39  20 15:39

## 2020-12-22 NOTE — RADIOLOGY REPORT (SQ)
EXAM DESCRIPTION:  U/S NON-OB PELVIS W/O DOP



IMAGES COMPLETED DATE/TIME:  12/22/2020 4:34 pm



REASON FOR STUDY:  pelvic pain, vaginal spotting, lmp 12/7-12/19



COMPARISON:  None.



TECHNIQUE:  Dynamic and static grayscale images acquired of the pelvis via transabdominal approach an
d recorded on PACS. Additional selected color Doppler and spectral images recorded.



LIMITATIONS:  None.



FINDINGS:  UTERUS: Contour normal. No mass.

ENDOMETRIAL STRIPE: No focal or generalized thickening. No masses.

CERVIX:  The cervix measures 3.3 cm in length.  IUD appears to lie between the uterine fundus and the
 lower uterine segment to the left of the midline.  Nabothian cyst measures 1.6 x 1.2 x 1.3 cm.

RIGHT OVARY AND DOPPLER:  A complex cystic mass measures 2.4 x 1.7 x 1.7 cm. Normal arterial vascular
 flow without evidence for torsion.

LEFT OVARY AND DOPPLER: Normal size. No worrisome masses. Normal arterial vascular flow without evide
nce for torsion.

FREE FLUID: None noted.

OTHER: No other significant finding.

MEASUREMENTS:

UTERUS: 10.0 x 5.2 x 3.0 cm

ENDOMETRIAL STRIPE: 3.0 mm

RIGHT OVARY: 3.5 x 2.5 x 2.1 cm

LEFT OVARY: 2.7 x 2.2 x 1.8 cm



IMPRESSION:  1.  IUD as above.

2.  A complex right cystic mass as above.  See below.



COMMENT:   Followup of asymptomatic indeterminate ovarian cysts detected by ultrasound in PREMENOPAUS
AL patients

Cyst with findings suggestive of, but not classic for, hemorrhagic cyst, endometrioma or dermoid:



*6-12 week followup US; if not a resolving hemorrhagic cyst, continued US or MRI followup; if endomet
rioma or dermoid still not confirmed,  consider surgical consultation

Single thin septation or focal wall calcification:



*Same as for benign cyst, based on size

Multiple septations:



*Consider surgical consultation

Nodule in a cyst:



*No blood flow in nodule: MRI or surgical consultation

*Blood flow in nodule: surgical consultation

Note: If cyst is clinically symptomatic or otherwise concerning, other followup may be warranted.

Based on recommendations of the Society for Radiologists in Ultrasound Consensus Conference Statement
 2010 on management of asymptomatic ovarian and other adnexal cysts imaged at  ultrasound.



TECHNICAL DOCUMENTATION:  JOB ID:  [id number]

 [employer]- All Rights Reserved                          [id number]-[id number]



Reading location - IP/workstation name: [email or web address]

## 2020-12-23 LAB — PATH REV BLD -IMP: (no result)
